# Patient Record
Sex: FEMALE | Race: WHITE | NOT HISPANIC OR LATINO | Employment: FULL TIME | URBAN - METROPOLITAN AREA
[De-identification: names, ages, dates, MRNs, and addresses within clinical notes are randomized per-mention and may not be internally consistent; named-entity substitution may affect disease eponyms.]

---

## 2020-02-28 ENCOUNTER — RECORDS - HEALTHEAST (OUTPATIENT)
Dept: LAB | Facility: CLINIC | Age: 25
End: 2020-02-28

## 2020-03-02 LAB — C TRACH DNA SPEC QL PROBE+SIG AMP: NEGATIVE

## 2020-07-17 ENCOUNTER — RECORDS - HEALTHEAST (OUTPATIENT)
Dept: LAB | Facility: CLINIC | Age: 25
End: 2020-07-17

## 2020-07-18 LAB — BACTERIA SPEC CULT: NORMAL

## 2020-07-31 ENCOUNTER — RECORDS - HEALTHEAST (OUTPATIENT)
Dept: LAB | Facility: CLINIC | Age: 25
End: 2020-07-31

## 2020-07-31 LAB — TSH SERPL DL<=0.005 MIU/L-ACNC: 2.34 UIU/ML (ref 0.3–5)

## 2020-12-09 ENCOUNTER — OFFICE VISIT (OUTPATIENT)
Dept: URGENT CARE | Facility: URGENT CARE | Age: 25
End: 2020-12-09
Payer: COMMERCIAL

## 2020-12-09 VITALS
WEIGHT: 167 LBS | TEMPERATURE: 99.4 F | DIASTOLIC BLOOD PRESSURE: 92 MMHG | HEART RATE: 98 BPM | SYSTOLIC BLOOD PRESSURE: 138 MMHG | OXYGEN SATURATION: 98 %

## 2020-12-09 DIAGNOSIS — F41.9 ACUTE ANXIETY: Primary | ICD-10-CM

## 2020-12-09 DIAGNOSIS — R07.89 ATYPICAL CHEST PAIN: ICD-10-CM

## 2020-12-09 DIAGNOSIS — R00.0 TACHYCARDIA: ICD-10-CM

## 2020-12-09 PROCEDURE — 99205 OFFICE O/P NEW HI 60 MIN: CPT | Performed by: PHYSICIAN ASSISTANT

## 2020-12-09 PROCEDURE — 93000 ELECTROCARDIOGRAM COMPLETE: CPT | Performed by: PHYSICIAN ASSISTANT

## 2020-12-09 RX ORDER — HYDROXYZINE HYDROCHLORIDE 25 MG/1
50 TABLET, FILM COATED ORAL EVERY 6 HOURS PRN
Qty: 24 TABLET | Refills: 0 | Status: SHIPPED | OUTPATIENT
Start: 2020-12-09 | End: 2020-12-12

## 2020-12-09 RX ORDER — NORGESTIMATE AND ETHINYL ESTRADIOL 0.25-0.035
1 KIT ORAL DAILY
COMMUNITY

## 2020-12-09 NOTE — PATIENT INSTRUCTIONS
Does not appear cardiac    Treat acute anxiety    Follow up right away with new or worsening symptoms as discussed    Check in with primary care provider this week if not improving        Patient Education     Anxiety Reaction  Anxiety is the feeling we all get when we think something bad might happen. It is a normal response to stress and usually causes only a mild reaction. When anxiety becomes more severe, it can interfere with daily life. In some cases, you may not even be aware of what it is you re anxious about. There may also be a genetic link or it may be a learned behavior in the home.  Both psychological and physical triggers cause stress reaction. It's often a response to fear or emotional stress, real or imagined. This stress may come from home, family, work, or social relationships.  During an anxiety reaction, you may feel:    Helpless    Nervous    Depressed    Irritable  Your body may show signs of anxiety in many ways. You may experience:    Dry mouth    Shakiness    Dizziness    Weakness    Trouble breathing    Breathing fast (hyperventilating)    Chest pressure    Sweating    Headache    Nausea    Diarrhea    Tiredness    Inability to sleep    Sexual problems  Home care    Try to locate the sources of stress in your life. They may not be obvious. These may include:  ? Daily hassles of life (such as traffic jams, missed appointments, or car troubles)  ? Major life changes, both good (new baby or job promotion) and bad (loss of job or loss of loved one)  ? Overload: feeling that you have too many responsibilities and can't take care of all of them at once  ? Feeling helpless or feeling that your problems are beyond what you re able to solve    Notice how your body reacts to stress. Learn to listen to your body signals. This will help you take action before the stress becomes severe.    When you can, do something about the source of your stress. (Avoid hassles, limit the amount of change that happens  in your life at one time and take a break when you feel overloaded).    Unfortunately, many stressful situations can't be avoided. It is necessary to learn how to better manage stress. There are many proven methods that will reduce your anxiety. These include simple things like exercise, good nutrition, and adequate rest. Also, there are certain techniques that are helpful:  ? Relaxation  ? Breathing exercises  ? Visualization  ? Biofeedback  ? Meditation  For more information about this, consult your healthcare provider or go to a local bookstore and review the many books and tapes available on this subject.  Follow-up care  If you feel that your anxiety is not responding to self-help measures, contact your healthcare provider or make an appointment with a counselor. You may need short-term psychological counseling and temporary medicine to help you manage stress.  Call 911  Call 911 if any of these happen:    Trouble breathing    Confusion    Drowsiness or trouble wakening    Fainting or loss of consciousness    Rapid heart rate    Seizure    New chest pain that becomes more severe, lasts longer, or spreads into your shoulder, arm, neck, jaw, or back  When to seek medical advice  Call your healthcare provider right away if any of these happen:    Your symptoms get worse    Severe headache not relieved by rest and mild pain reliever  [employer]Well last reviewed this educational content on 10/1/2017    8827-1052 [employer]. [address] [place] [address], [place] [address]. All rights reserved. This information is not intended as a substitute for professional medical care. Always follow your healthcare professional's instructions.           Patient Education     Uncertain Causes of Chest Pain    Chest pain can happen for a number of reasons. Sometimes the cause can't be determined. If your condition does not seem serious, and your pain does not appear to be coming from your heart, your healthcare provider may  recommend watching it closely. Sometimes the signs of a serious problem take more time to appear. Many problems not related to your heart can cause chest pain. These include:    Musculoskeletal. Costochondritis is an inflammation of the tissues around the ribs that can occur from trauma or overuse injuries, or a strain of the muscles of the chest wall    Respiratory. Pneumonia, collapsed lung (pneumothorax), or inflammation of the lining of the chest and lungs (pleurisy)    Gastrointestinal. Esophageal reflux, heartburn, ulcers, or gallbladder disease    Anxiety and panic disorders    Nerve compression and inflammation    Rare miscellaneous problems such as aortic aneurysm (a swelling of the large artery coming out of the heart) or pulmonary embolism (a blood clot in the lungs)  Home care  After your visit, follow these recommendations:    Rest today and avoid strenuous activity.    Take any prescribed medicine as directed.    Be aware of any recurrent chest pain and notice any changes  Follow-up care  Follow up with your healthcare provider if you do not start to feel better within 24 hours, or as advised.  Call 911  Call 911 if any of these occur:    A change in the type of pain: if it feels different, becomes more severe, lasts longer, or begins to spread into your shoulder, arm, neck, jaw or back    Shortness of breath or increased pain with breathing    Weakness, dizziness, or fainting    Rapid heart beat    Crushing sensation in your chest  When to seek medical advice  Call your healthcare provider right away if any of the following occur:    Cough with dark colored sputum (phlegm) or blood    Fever of 100.4 F (38 C) or higher, or as directed by your healthcare provider    Swelling, pain or redness in one leg  [employer] last reviewed this educational content on 5/1/2018 2000-2020 [employer]. [address] [place] [address]. All rights reserved. This information is not intended as a  substitute for professional medical care. Always follow your healthcare professional's instructions.

## 2020-12-09 NOTE — PROGRESS NOTES
SUBJECTIVE:   Tracy Rivera is a 25 year old female presenting with a chief complaint of chest pain since last night, coming and going lasting about 15-30 minutes.  Started at work, no specific event brought it on.  Feels like previous episodes of anxiety.  Never this long. History of stroke as a child.  Takes fluoxetine for anxiety. No other symptoms.  Symptom is there now, and was present upon rooming. Nothing seems to improve or worsen the symptoms.      Anxiety eevated, no thoughts of harming self or others.    No past medical history on file.  Current Outpatient Medications   Medication Sig Dispense Refill     FLUoxetine (PROZAC) 20 MG capsule Take 20 mg by mouth       norgestimate-ethinyl estradiol (ORTHO-CYCLEN) 0.25-35 MG-MCG tablet Take 1 tablet by mouth daily       Social History     Tobacco Use     Smoking status: Not on file   Substance Use Topics     Alcohol use: Not on file     No smoking    No smoking in home    2 strokes as a baby, with cerebral palsy.    No CV disease in first degree relatives    No surgeries.      Takes fluoxetine daily.      PCP has not recommended hydroxyzine for acute episodes.       ROS:  10 point ROS negative except as listed above      OBJECTIVE:  BP (!) 138/92   Pulse 98   Temp 99.4  F (37.4  C) (Tympanic)   Wt 75.8 kg (167 lb)   SpO2 98%   Breastfeeding No   GENERAL APPEARANCE: healthy, alert and no distress  EYES:  conjunctiva clear  HENT: ear canals and TM's normal.  Nose and mouth without ulcers, erythema or lesions  NECK: supple, nontender, no lymphadenopathy  RESP: lungs clear to auscultation - no rales, rhonchi or wheezes  CV: regular rates and rhythm, normal S1 S2, no murmur noted  ABDOMEN:  soft, nontender, no HSM or masses and bowel sounds normal  NEURO: Normal strength and tone, sensory exam grossly normal,  normal speech and mentation  SKIN: no suspicious lesions or rashes  Appearance: Well groomed, cooperative, healthy female  Orientation:  alert/oriented  Speech: regular rate and tone   Mood/Affect: Anxious, congruent  Thought Process: appropriate  Thought Content: appropriate  Psychomotor activity: No depressed psychomotor activity  Insight/judgment: Appropriate    EKG: Sinus    ASSESSMENT:  (F41.9) Acute anxiety  (primary encounter diagnosis)  (R07.89) Atypical chest pain  (R00.0) Tachycardia  Comment: Does not appear to be cardiac, gastrointestinal or pulmonary in etiology.  Wide differential considered. Likely acute anxiety exacerbation.  Plan: hydrOXYzine (ATARAX) 25 MG tablet  EKG 12-lead complete w/read - Clinics, EKG         12-lead complete w/read - Clinics               Patient Instructions   Does not appear cardiac    Treat acute anxiety    Follow up right away with new or worsening symptoms as discussed    Check in with primary care provider this week if not improving        Patient Education     Anxiety Reaction  Anxiety is the feeling we all get when we think something bad might happen. It is a normal response to stress and usually causes only a mild reaction. When anxiety becomes more severe, it can interfere with daily life. In some cases, you may not even be aware of what it is you re anxious about. There may also be a genetic link or it may be a learned behavior in the home.  Both psychological and physical triggers cause stress reaction. It's often a response to fear or emotional stress, real or imagined. This stress may come from home, family, work, or social relationships.  During an anxiety reaction, you may feel:    Helpless    Nervous    Depressed    Irritable  Your body may show signs of anxiety in many ways. You may experience:    Dry mouth    Shakiness    Dizziness    Weakness    Trouble breathing    Breathing fast (hyperventilating)    Chest pressure    Sweating    Headache    Nausea    Diarrhea    Tiredness    Inability to sleep    Sexual problems  Home care    Try to locate the sources of stress in your life. They may not  be obvious. These may include:  ? Daily hassles of life (such as traffic jams, missed appointments, or car troubles)  ? Major life changes, both good (new baby or job promotion) and bad (loss of job or loss of loved one)  ? Overload: feeling that you have too many responsibilities and can't take care of all of them at once  ? Feeling helpless or feeling that your problems are beyond what you re able to solve    Notice how your body reacts to stress. Learn to listen to your body signals. This will help you take action before the stress becomes severe.    When you can, do something about the source of your stress. (Avoid hassles, limit the amount of change that happens in your life at one time and take a break when you feel overloaded).    Unfortunately, many stressful situations can't be avoided. It is necessary to learn how to better manage stress. There are many proven methods that will reduce your anxiety. These include simple things like exercise, good nutrition, and adequate rest. Also, there are certain techniques that are helpful:  ? Relaxation  ? Breathing exercises  ? Visualization  ? Biofeedback  ? Meditation  For more information about this, consult your healthcare provider or go to a local bookstore and review the many books and tapes available on this subject.  Follow-up care  If you feel that your anxiety is not responding to self-help measures, contact your healthcare provider or make an appointment with a counselor. You may need short-term psychological counseling and temporary medicine to help you manage stress.  Call 911  Call 911 if any of these happen:    Trouble breathing    Confusion    Drowsiness or trouble wakening    Fainting or loss of consciousness    Rapid heart rate    Seizure    New chest pain that becomes more severe, lasts longer, or spreads into your shoulder, arm, neck, jaw, or back  When to seek medical advice  Call your healthcare provider right away if any of these happen:    Your  symptoms get worse    Severe headache not relieved by rest and mild pain reliever  [employer] last reviewed this educational content on 10/1/2017    6526-3796 [employer]. [address] [facility], [place] [address]. All rights reserved. This information is not intended as a substitute for professional medical care. Always follow your healthcare professional's instructions.           Patient Education     Uncertain Causes of Chest Pain    Chest pain can happen for a number of reasons. Sometimes the cause can't be determined. If your condition does not seem serious, and your pain does not appear to be coming from your heart, your healthcare provider may recommend watching it closely. Sometimes the signs of a serious problem take more time to appear. Many problems not related to your heart can cause chest pain. These include:    Musculoskeletal. Costochondritis is an inflammation of the tissues around the ribs that can occur from trauma or overuse injuries, or a strain of the muscles of the chest wall    Respiratory. Pneumonia, collapsed lung (pneumothorax), or inflammation of the lining of the chest and lungs (pleurisy)    Gastrointestinal. Esophageal reflux, heartburn, ulcers, or gallbladder disease    Anxiety and panic disorders    Nerve compression and inflammation    Rare miscellaneous problems such as aortic aneurysm (a swelling of the large artery coming out of the heart) or pulmonary embolism (a blood clot in the lungs)  Home care  After your visit, follow these recommendations:    Rest today and avoid strenuous activity.    Take any prescribed medicine as directed.    Be aware of any recurrent chest pain and notice any changes  Follow-up care  Follow up with your healthcare provider if you do not start to feel better within 24 hours, or as advised.  Call 911  Call 911 if any of these occur:    A change in the type of pain: if it feels different, becomes more severe, lasts longer, or begins to spread  into your shoulder, arm, neck, jaw or back    Shortness of breath or increased pain with breathing    Weakness, dizziness, or fainting    Rapid heart beat    Crushing sensation in your chest  When to seek medical advice  Call your healthcare provider right away if any of the following occur:    Cough with dark colored sputum (phlegm) or blood    Fever of 100.4 F (38 C) or higher, or as directed by your healthcare provider    Swelling, pain or redness in one leg  [employer]ing last reviewed this educational content on 5/1/2018 2000-2020 [employer]. [address] [place] [address]. All rights reserved. This information is not intended as a substitute for professional medical care. Always follow your healthcare professional's instructions.

## 2020-12-09 NOTE — LETTER
[facility] [place]  [address]  [address]  [place] [address]  Phone: [phone]  Fax: [phone]    December 9, 2020        Tracy Rivera  [id number]  [facility] [address]          To whom it may concern:    RE: Tracy Rivera    Patient was seen and treated today at our clinic.  Please excuse absences on 12/9 and 12/10/20.    Please contact me for questions or concerns.      Sincerely,        Carlos Blanton PA-C

## 2021-01-25 ENCOUNTER — TRANSFERRED RECORDS (OUTPATIENT)
Dept: HEALTH INFORMATION MANAGEMENT | Facility: CLINIC | Age: 26
End: 2021-01-25

## 2021-01-25 ENCOUNTER — RECORDS - HEALTHEAST (OUTPATIENT)
Dept: LAB | Facility: CLINIC | Age: 26
End: 2021-01-25

## 2021-01-26 LAB
ANION GAP SERPL CALCULATED.3IONS-SCNC: 10 MMOL/L (ref 5–18)
BUN SERPL-MCNC: 15 MG/DL (ref 8–22)
CALCIUM SERPL-MCNC: 9.6 MG/DL (ref 8.5–10.5)
CHLORIDE BLD-SCNC: 106 MMOL/L (ref 98–107)
CO2 SERPL-SCNC: 24 MMOL/L (ref 22–31)
CREAT SERPL-MCNC: 0.61 MG/DL (ref 0.6–1.1)
GFR SERPL CREATININE-BSD FRML MDRD: >60 ML/MIN/1.73M2
GLUCOSE BLD-MCNC: 88 MG/DL (ref 70–125)
POTASSIUM BLD-SCNC: 4.2 MMOL/L (ref 3.5–5)
SODIUM SERPL-SCNC: 140 MMOL/L (ref 136–145)
TSH SERPL DL<=0.005 MIU/L-ACNC: 1.5 UIU/ML (ref 0.3–5)

## 2021-02-19 ENCOUNTER — HOSPITAL ENCOUNTER (OUTPATIENT)
Dept: PHYSICAL THERAPY | Facility: CLINIC | Age: 26
Setting detail: THERAPIES SERIES
End: 2021-02-19
Attending: PHYSICIAN ASSISTANT
Payer: COMMERCIAL

## 2021-02-19 PROCEDURE — 97110 THERAPEUTIC EXERCISES: CPT | Mod: GP | Performed by: PHYSICAL THERAPIST

## 2021-02-19 PROCEDURE — 97161 PT EVAL LOW COMPLEX 20 MIN: CPT | Mod: GP | Performed by: PHYSICAL THERAPIST

## 2021-02-19 NOTE — PROGRESS NOTES
02/19/21 1500   Quick Adds   Type of Visit Initial OP PT Evaluation   General Information   Start of Care Date 02/19/21   Referring Physician Cris Mcclellan PA-C     Orders Evaluate and Treat as Indicated   Order Date 01/28/21   Medical Diagnosis Weakness, cerebral palsy, unspecified type     Onset of illness/injury or Date of Surgery 01/28/21   Surgical/Medical history reviewed Yes   Pertinent history of current problem (include personal factors and/or comorbidities that impact the POC) Tracy presents today with reports of weakness and muscle tightness. The pt enjoys running when the weather is nice. She also likes to do some cardio/workout videos at home. Finds that it takes more consentration with complex movements   Patient role/Employment history Employed   Living environment Apartment/condo   Home/Community Accessibility Comments alone   Patient/Family Goals Statement Improve L side strength, flexibility and posture   Fall Risk Screen   Fall screen completed by PT   Have you fallen 2 or more times in the past year? No   Have you fallen and had an injury in the past year? No   Is patient a fall risk? No   Abuse Screen (yes response referral indicated)   Feels Unsafe at Home or Work/School no   Feels Threatened by Someone no   Does Anyone Try to Keep You From Having Contact with Others or Doing Things Outside Your Home? no   Physical Signs of Abuse Present no   Pain   Patient currently in pain No   Cognitive Status Examination   Orientation orientation to person, place and time   Posture   Posture Forward head position   Range of Motion (ROM)   ROM Comment tightness through L glutes, hamstring, gastroc   Strength   Strength Comments L UE grossly 4/5, hip abduction 4+/5, hip flexion 4+/5, dorsiflexion 4/5   Bed Mobility   Bed Mobility Comments Independent   Transfer Skills   Transfer Comments Independent   Gait   Gait Comments Ambulates without device, acheives heel strike   Balance Special Tests   Balance  Special Tests Sit to stand reps   Balance Special Tests Sit to Stand Reps in 30 Seconds   Reps in 30 seconds 18   Sensory Examination   Sensory Perception no deficits were identified   Coordination   Coordination Comments mild incoordination with L UE with fast movements, multijoint movements   Muscle Tone   Muscle Tone Comments mild increased tone in LLE in gastroc, glutes, HS   Planned Therapy Interventions   Planned Therapy Interventions strengthening;stretching   Clinical Impression   Criteria for Skilled Therapeutic Interventions Met yes, treatment indicated   PT Diagnosis weakness and impaired mobility of L UE  and LE   Influenced by the following impairments weakness, posture, muscle length/tone/flexibility   Functional limitations due to impairments tightness and difficulty with exercising at home   Clinical Presentation Stable/Uncomplicated   Clinical Decision Making (Complexity) Low complexity   Therapy Frequency 1 time/week   Predicted Duration of Therapy Intervention (days/wks) 1 visit   Risk & Benefits of therapy have been explained Yes   Patient, Family & other staff in agreement with plan of care Yes   Clinical Impression Comments The pt requires 1 visit to initiate home program   GOALS   PT Eval Goals 1   Goal 1   Goal Identifier HEP   Goal Description The pt will be independent with a home program focusing on improving muscle length/flexibiity and strength in order to manage muscle tightness and weakness independently   Target Date 02/19/21   Date Met 02/19/21   Total Evaluation Time   PT Eval, Low Complexity Minutes (64728) 15

## 2021-03-07 ENCOUNTER — HEALTH MAINTENANCE LETTER (OUTPATIENT)
Age: 26
End: 2021-03-07

## 2021-03-12 ENCOUNTER — RECORDS - HEALTHEAST (OUTPATIENT)
Dept: LAB | Facility: CLINIC | Age: 26
End: 2021-03-12

## 2021-03-12 LAB
SARS-COV-2 PCR COMMENT: NORMAL
SARS-COV-2 RNA SPEC QL NAA+PROBE: NEGATIVE
SARS-COV-2 VIRUS SPECIMEN SOURCE: NORMAL

## 2021-04-02 ENCOUNTER — RECORDS - HEALTHEAST (OUTPATIENT)
Dept: LAB | Facility: CLINIC | Age: 26
End: 2021-04-02

## 2021-04-02 LAB — FERRITIN SERPL-MCNC: 28 NG/ML (ref 10–130)

## 2021-04-05 LAB
25(OH)D3 SERPL-MCNC: 28.9 NG/ML (ref 30–80)
B BURGDOR IGG+IGM SER QL: 0.11 INDEX VALUE

## 2021-04-22 ENCOUNTER — RECORDS - HEALTHEAST (OUTPATIENT)
Dept: LAB | Facility: CLINIC | Age: 26
End: 2021-04-22

## 2021-04-24 LAB — BACTERIA SPEC CULT: NO GROWTH

## 2021-10-11 ENCOUNTER — HEALTH MAINTENANCE LETTER (OUTPATIENT)
Age: 26
End: 2021-10-11

## 2022-03-27 ENCOUNTER — HEALTH MAINTENANCE LETTER (OUTPATIENT)
Age: 27
End: 2022-03-27

## 2022-06-08 ENCOUNTER — OFFICE VISIT (OUTPATIENT)
Dept: URGENT CARE | Facility: URGENT CARE | Age: 27
End: 2022-06-08
Payer: COMMERCIAL

## 2022-06-08 ENCOUNTER — HOSPITAL ENCOUNTER (EMERGENCY)
Facility: CLINIC | Age: 27
Discharge: HOME | End: 2022-06-08
Attending: EMERGENCY MEDICINE | Admitting: EMERGENCY MEDICINE
Payer: COMMERCIAL

## 2022-06-08 ENCOUNTER — HOSPITAL ENCOUNTER (EMERGENCY)
Facility: CLINIC | Age: 27
Discharge: HOME | End: 2022-06-08
Payer: COMMERCIAL

## 2022-06-08 VITALS
RESPIRATION RATE: 20 BRPM | TEMPERATURE: 98.2 F | OXYGEN SATURATION: 96 % | HEART RATE: 78 BPM | SYSTOLIC BLOOD PRESSURE: 145 MMHG | DIASTOLIC BLOOD PRESSURE: 98 MMHG | WEIGHT: 175 LBS

## 2022-06-08 VITALS
HEART RATE: 75 BPM | SYSTOLIC BLOOD PRESSURE: 150 MMHG | OXYGEN SATURATION: 100 % | DIASTOLIC BLOOD PRESSURE: 96 MMHG | RESPIRATION RATE: 16 BRPM | TEMPERATURE: 98 F

## 2022-06-08 DIAGNOSIS — H02.846 SWELLING OF EYELID, LEFT: ICD-10-CM

## 2022-06-08 DIAGNOSIS — H10.32 ACUTE CONJUNCTIVITIS OF LEFT EYE, UNSPECIFIED ACUTE CONJUNCTIVITIS TYPE: ICD-10-CM

## 2022-06-08 DIAGNOSIS — R03.0 ELEVATED BLOOD PRESSURE READING WITHOUT DIAGNOSIS OF HYPERTENSION: ICD-10-CM

## 2022-06-08 DIAGNOSIS — H11.422: Primary | ICD-10-CM

## 2022-06-08 PROCEDURE — 99214 OFFICE O/P EST MOD 30 MIN: CPT | Performed by: PHYSICIAN ASSISTANT

## 2022-06-08 PROCEDURE — 250N000009 HC RX 250

## 2022-06-08 PROCEDURE — 99283 EMERGENCY DEPT VISIT LOW MDM: CPT

## 2022-06-08 RX ORDER — POLYMYXIN B SULFATE AND TRIMETHOPRIM 1; 10000 MG/ML; [USP'U]/ML
1 SOLUTION OPHTHALMIC EVERY 4 HOURS
Qty: 2 ML | Refills: 0 | Status: SHIPPED | OUTPATIENT
Start: 2022-06-08 | End: 2022-06-12

## 2022-06-08 NOTE — LETTER
June 8, 2022      To Whom It May Concern:      Tracy Rivera was seen in our Emergency Department today, 06/08/22.  I expect her condition to improve over the next few days.  She may return to work/school when improved.    Sincerely,        Lluvia Slade RN

## 2022-06-08 NOTE — PATIENT INSTRUCTIONS
Workplace exposure to unknown chemical requires greater assessment than what can be provided her in UC    Please be seen in ER or by eye doctor TONIGHT to test eye pH and pressure

## 2022-06-09 NOTE — DISCHARGE INSTRUCTIONS
"Discharge Instructions  Conjunctivitis  Conjunctivitis, or \"pinkeye\", is inflammation of the conjunctiva which is the thin membrane that lines the inner surface of the eyelids and the whites of the eyes.   There are four main types of conjunctivitis: viral, bacterial, allergic, and non-specific. Both bacterial and viral conjunctivitis spread easily from one person to another by contact with the eye or another person s hands, by an object the infected person has touched, such as a door handle, or by sharing an object that has touched their eye such as a towel or pillow case. Because of this, children with bacterial conjunctivitis can t go back to school or  until they have been on antibiotic drops for 24 hours.  VIRAL CONJUNCTIVITIS:  This is typically caused by the virus that also causes the common cold and is often seen as part of a general cold.  This type of conjunctivitis is not treated with antibiotics, and usually lasts 3 - 5 days.  An over the counter antihistamine/decongestant eye drop may help to relieve the itching and irritation of viral conjunctivitis.  BACTERIAL CONJUNCTIVITIS:  This is treated with an antibiotic ointment or eye drop.  In both bacterial and viral conjunctivitis, do not wear contact lenses until your eye is no longer red.   Your contact case should be thrown away and the contacts disinfected overnight, or replaced if disposable.  NON SPECIFIC CONJUNCTIVITIS: Sometimes a red eye is caused by other things such as dry eye, chemical exposure, or foreign body in the eye such as dust or eyelash.   All of these problems generally improve on their own within 24 hours.  ALLERGIC CONJUNCTIVITIS: These are eye symptoms caused by allergies. This type of conjunctivitis will be treated with allergy medications.    Return to the Emergency Department if:  If you have blurry vision.  If you have increasing eye pain or drainage.  If you have redness or swelling in the skin around the eye.  If you " have a fever.    Follow-up with your doctor:    Your eye should improve within 2 days, if it does not, return to the Emergency Department or see your regular doctor for a second eye exam.  If you were given a prescription for medicine here today, be sure to read all of the information (including the package insert) that comes with your prescription.  This will include important information about the medicine, its side effects, and any warnings that you need to know about.  The pharmacist who fills the prescription can provide more information and answer questions you may have about the medicine.  If you have questions or concerns that the pharmacist cannot address, please call or return to the Emergency Department.       Opioid Medication Information    Pain medications are among the most commonly prescribed medicines, so we are including this information for all our patients. If you did not receive pain medication or get a prescription for pain medicine, you can ignore it.     You may have been given a prescription for an opioid (narcotic) pain medicine and/or have received a pain medicine while here in the Emergency Department. These medicines can make you drowsy or impaired. You must not drive, operate dangerous equipment, or engage in any other dangerous activities while taking these medications. If you drive while taking these medications, you could be arrested for DUI, or driving under the influence. Do not drink any alcohol while you are taking these medications.     Opioid pain medications can cause addiction. If you have a history of chemical dependency of any type, you are at a higher risk of becoming addicted to pain medications.  Only take these prescribed medications to treat your pain when all other options have been tried. Take it for as short a time and as few doses as possible. Store your pain pills in a secure place, as they are frequently stolen and provide a dangerous opportunity for children or  visitors in your house to start abusing these powerful medications. We will not replace any lost or stolen medicine.  As soon as your pain is better, you should flush all your remaining medication.     Many prescription pain medications contain Tylenol  (acetaminophen), including Vicodin , Tylenol #3 , Norco , Lortab , and Percocet .  You should not take any extra pills of Tylenol  if you are using these prescription medications or you can get very sick.  Do not ever take more than 3000 mg of acetaminophen in any 24 hour period.    All opioids tend to cause constipation. Drink plenty of water and eat foods that have a lot of fiber, such as fruits, vegetables, prune juice, apple juice and high fiber cereal.  Take a laxative if you don t move your bowels at least every other day. Miralax , Milk of Magnesia, Colace , or Senna  can be used to keep you regular.      Remember that you can always come back to the Emergency Department if you are not able to see your regular doctor in the amount of time listed above, if you get any new symptoms, or if there is anything that worries you.     Discharge Instructions  Hypertension - High Blood Pressure    During you visit to the Emergency Department, your blood pressure was higher than the recommended blood pressure.  This may be related to stress, pain, medication or other temporary conditions. In these cases, your blood pressure may return to normal on its own. If you have a history of high blood pressure, you may need to have your doctor adjust your medications. Sometimes, your high measurement here may indicate that you have developed high blood pressure that will stay high unless it is treated. Sudden very high blood pressure can cause problems, but usually high blood pressure causes problems over months to years.      Blood pressure is almost never lowered in the Emergency Department, because studies have shown that lowering blood pressure too quickly is much more  dangerous than leaving it alone.    You need to follow up with your doctor in 1-3 days to get your blood pressure rechecked.     Return to the Emergency Department if you start to have:  A severe headache.  Chest pain.  Shortness of breath.  Weakness or numbness that affects one part of the body.  Confusion.  Vision changes.  Significant swelling of legs and/or eyes.  A reaction to any medication started in the Emergency Department.    What can I do to help myself?  Avoid alcohol.  Take any blood pressure medicine that you are prescribed.  Get a good night s sleep.  Lower your salt intake.  Exercise.  Lose weight.  Manage stress.    If blood pressure medication was started in the Emergency Department:  The medicine may not have an immediate effect. The body and brain determine what blood pressure you have. The medicine s job is to retrain the body s  thermostat  to a lower blood pressure.  You will need to follow up with your doctor to see how this medicine is working for you.  If you were given a prescription for medicine here today, be sure to read all of the information (including the package insert) that comes with your prescription.  This will include important information about the medicine, its side effects, and any warnings that you need to know about.  The pharmacist who fills the prescription can provide more information and answer questions you may have about the medicine.  If you have questions or concerns that the pharmacist cannot address, please call or return to the Emergency Department.   Opioid Medication Information    Pain medications are among the most commonly prescribed medicines, so we are including this information for all our patients. If you did not receive pain medication or get a prescription for pain medicine, you can ignore it.     You may have been given a prescription for an opioid (narcotic) pain medicine and/or have received a pain medicine while here in the Emergency Department.  These medicines can make you drowsy or impaired. You must not drive, operate dangerous equipment, or engage in any other dangerous activities while taking these medications. If you drive while taking these medications, you could be arrested for DUI, or driving under the influence. Do not drink any alcohol while you are taking these medications.     Opioid pain medications can cause addiction. If you have a history of chemical dependency of any type, you are at a higher risk of becoming addicted to pain medications.  Only take these prescribed medications to treat your pain when all other options have been tried. Take it for as short a time and as few doses as possible. Store your pain pills in a secure place, as they are frequently stolen and provide a dangerous opportunity for children or visitors in your house to start abusing these powerful medications. We will not replace any lost or stolen medicine.  As soon as your pain is better, you should flush all your remaining medication.     Many prescription pain medications contain Tylenol  (acetaminophen), including Vicodin , Tylenol #3 , Norco , Lortab , and Percocet .  You should not take any extra pills of Tylenol  if you are using these prescription medications or you can get very sick.  Do not ever take more than 3000 mg of acetaminophen in any 24 hour period.    All opioids tend to cause constipation. Drink plenty of water and eat foods that have a lot of fiber, such as fruits, vegetables, prune juice, apple juice and high fiber cereal.  Take a laxative if you don t move your bowels at least every other day. Miralax , Milk of Magnesia, Colace , or Senna  can be used to keep you regular.      Remember that you can always come back to the Emergency Department if you are not able to see your regular doctor in the amount of time listed above, if you get any new symptoms, or if there is anything that worries you.

## 2022-06-09 NOTE — ED TRIAGE NOTES
Pt reports eye pain and itchiness with some eyelid swelling around 330. 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

## 2022-06-09 NOTE — ED PROVIDER NOTES
History     Chief Complaint:    Eye Pain      HPI   Tracy Rivera is a 26 year old female who presents with itching burning and discomfort in her left eye.  The symptoms started today while she was at work.  The patient says she was working with pills but no liquids or chemicals.  She works as a pharmacy tech.  The patient said that she felt some burning along the medial canthus of her left eye she rubbed it and then had increasing pain.  Following there was a film in the eye but never any vision change.  She went to her bathroom at work and flushed her eye.  The eye was swollen she went to occupational health and then to an urgent care who told her to come to the ER.  The patient brought a picture that her eyelid was swollen but now better without any treatment.  She has had no pus vision changes no history of eye problems.  In the eye currently feels better.  The patient reports her vision has been good throughout this whole time.    Review of Systems  Six-point review of systems all negative except as in HPI    Allergies:    Sulfa Drugs  Penicillins      Medications:      trimethoprim-polymyxin b (POLYTRIM) 74130-0.1 UNIT/ML-% ophthalmic solution  FLUoxetine (PROZAC) 20 MG capsule  norgestimate-ethinyl estradiol (ORTHO-CYCLEN) 0.25-35 MG-MCG tablet        Past Medical History:    No past medical history on file.  There are no problems to display for this patient.       Past Surgical History:    No eye surgery      Social History:    The patient presents to the ED with her sister  Works as a pharmacy tech    Physical Exam     Patient Vitals for the past 24 hrs:   BP Temp Temp src Pulse Resp SpO2   06/08/22 2129 (!) 150/96 -- -- 75 16 100 %   06/08/22 1957 (!) 164/113 98  F (36.7  C) Temporal 89 18 99 %       Physical Exam  General: The patient is alert, in no respiratory distress.    HENT: Mucous membranes moist.  There is injection of the left sclera.  Extra motions are intact funduscopic exam within  normal limits.  Lid lashes and lacrimal are normal except for cobblestoning of the internal surface of the lower left eyelid.  Gentle pressure over the closed eyelid is equal on both sides.       Respiratory: Breathing nonlabored      Musculoskeletal: No gross deformity.     Skin: No rashes or petechiae.  No stye or swelling around the eyelids    Neurologic: The patient is alert and appropriately interactive    Lymphatic: No cervical adenopathy.     Psychiatric: The patient is non-tearful.    Emergency Department Course         Procedures:      Emergency Department Course:             Reviewed:    I reviewed nursing notes and vitals    Assessments:   I obtained history and examined the patient as noted above.         Consults:            Interventions:    Medications   fluorescein (FUL-MELISSA) 1 MG ophthalmic strip (has no administration in time range)       Disposition:  The patient was discharged to home.     Impression & Plan      Medical Decision Making:  The patient had not felt that she had any trauma or got any medicine in her eye.  I felt that likely this is a conjunctivitis limited to 1 I with cobblestoning and injection.  As she had rubbed the eye and flushed the eye she likely exacerbated symptoms causing the swelling that I visualized on the picture of her eye.  She is since gotten better there was only ever clear tearing.  I did have her check the pH which was normal did a funduscopic exam.  I did stain the eye and there was no signs of stain uptake.  I do not think this is glaucoma.  I started her on antibiotic and will have her follow-up as an outpatient she was discharged home in good condition.  Her gross vision was intact.    Covid-19  Tracy Rivera was evaluated during a global COVID-19 pandemic, which necessitated consideration that the patient might be at risk for infection with the SARS-CoV-2 virus that causes COVID-19.   Applicable protocols for evaluation were followed during the patient's  care.   COVID-19 was considered as part of the patient's evaluation.    Diagnosis:    ICD-10-CM    1. Acute conjunctivitis of left eye, unspecified acute conjunctivitis type  H10.32    2. Elevated blood pressure reading without diagnosis of hypertension  R03.0        Discharge Medications:  Discharge Medication List as of 6/8/2022  9:14 PM      START taking these medications    Details   trimethoprim-polymyxin b (POLYTRIM) 05535-3.1 UNIT/ML-% ophthalmic solution Apply 1 drop to eye every 4 hours for 4 days, Disp-2 mL, R-0, Local Print                  Casey Gonsalez MD  06/08/22 7413

## 2022-06-10 NOTE — PROGRESS NOTES
EMPLOYER [employer]  Abrupt onset left eye swelling and discomfort following exposure to unknown chemical in pharmacy  INjury occurred this afternoon at work    SUBJECTIVE:  Chief Complaint:   Chief Complaint   Patient presents with     Urgent Care     Swollen eyes     History of Present Illness:  Tracy Rivera is a 26 year old female who presents complaining of moderate left eye edema, eyelid swelling for 1 hour(s).   Onset/timing: sudden.    Associated Signs and Symptoms: none  Treatment measures tried include: flushed with water       No past medical history on file.  Current Outpatient Medications   Medication Sig Dispense Refill     FLUoxetine (PROZAC) 20 MG capsule Take 20 mg by mouth       norgestimate-ethinyl estradiol (ORTHO-CYCLEN) 0.25-35 MG-MCG tablet Take 1 tablet by mouth daily       trimethoprim-polymyxin b (POLYTRIM) 19985-1.1 UNIT/ML-% ophthalmic solution Apply 1 drop to eye every 4 hours for 4 days 2 mL 0        ROS:  Review of systems negative except as stated above.    OBJECTIVE:  BP (!) 145/98   Pulse 78   Temp 98.2  F (36.8  C)   Resp 20   Wt 79.4 kg (175 lb)   SpO2 96%   General: no acute distress  Eye exam: right eye normal lid, conjunctiva, left eye swollen  lid, swollen conjunctiva, feels heavy  Neck: supple, non-tender, free range of motion, no adenopathy  Heart: NORMAL - regular rate and rhythm without murmur.  Lungs: normal and clear to auscultation    ASSESSMENT  (H11.422) Chemosis, left  (primary encounter diagnosis)  (H02.846) Swelling of eyelid, left  Comment: need for pH and pressure measurement  Plan: to ED or ophtho immediately by private vehicle driven by sister

## 2022-09-24 ENCOUNTER — HEALTH MAINTENANCE LETTER (OUTPATIENT)
Age: 27
End: 2022-09-24

## 2023-05-08 ENCOUNTER — HEALTH MAINTENANCE LETTER (OUTPATIENT)
Age: 28
End: 2023-05-08

## 2024-03-18 ENCOUNTER — OFFICE VISIT (OUTPATIENT)
Dept: URGENT CARE | Facility: URGENT CARE | Age: 29
End: 2024-03-18
Payer: COMMERCIAL

## 2024-03-18 VITALS
OXYGEN SATURATION: 97 % | HEIGHT: 62 IN | SYSTOLIC BLOOD PRESSURE: 156 MMHG | BODY MASS INDEX: 33.68 KG/M2 | DIASTOLIC BLOOD PRESSURE: 115 MMHG | WEIGHT: 183 LBS | TEMPERATURE: 98.1 F | HEART RATE: 86 BPM

## 2024-03-18 DIAGNOSIS — R82.90 ABNORMAL FINDING IN URINE: ICD-10-CM

## 2024-03-18 DIAGNOSIS — R03.0 ELEVATED BLOOD PRESSURE READING WITHOUT DIAGNOSIS OF HYPERTENSION: Primary | ICD-10-CM

## 2024-03-18 LAB
ALBUMIN UR-MCNC: NEGATIVE MG/DL
APPEARANCE UR: CLEAR
BACTERIA #/AREA URNS HPF: ABNORMAL /HPF
BASOPHILS # BLD AUTO: 0 10E3/UL (ref 0–0.2)
BASOPHILS NFR BLD AUTO: 0 %
BILIRUB UR QL STRIP: NEGATIVE
COLOR UR AUTO: ABNORMAL
EOSINOPHIL # BLD AUTO: 0.1 10E3/UL (ref 0–0.7)
EOSINOPHIL NFR BLD AUTO: 1 %
ERYTHROCYTE [DISTWIDTH] IN BLOOD BY AUTOMATED COUNT: 11.8 % (ref 10–15)
GLUCOSE UR STRIP-MCNC: NEGATIVE MG/DL
HCT VFR BLD AUTO: 43.9 % (ref 35–47)
HGB BLD-MCNC: 14.8 G/DL (ref 11.7–15.7)
HGB UR QL STRIP: ABNORMAL
IMM GRANULOCYTES # BLD: 0 10E3/UL
IMM GRANULOCYTES NFR BLD: 0 %
KETONES UR STRIP-MCNC: NEGATIVE MG/DL
LEUKOCYTE ESTERASE UR QL STRIP: ABNORMAL
LYMPHOCYTES # BLD AUTO: 2.3 10E3/UL (ref 0.8–5.3)
LYMPHOCYTES NFR BLD AUTO: 25 %
MCH RBC QN AUTO: 30.6 PG (ref 26.5–33)
MCHC RBC AUTO-ENTMCNC: 33.7 G/DL (ref 31.5–36.5)
MCV RBC AUTO: 91 FL (ref 78–100)
MONOCYTES # BLD AUTO: 0.3 10E3/UL (ref 0–1.3)
MONOCYTES NFR BLD AUTO: 3 %
NEUTROPHILS # BLD AUTO: 6.5 10E3/UL (ref 1.6–8.3)
NEUTROPHILS NFR BLD AUTO: 71 %
NITRATE UR QL: NEGATIVE
PH UR STRIP: 6 [PH] (ref 5–7)
PLATELET # BLD AUTO: 296 10E3/UL (ref 150–450)
RBC # BLD AUTO: 4.83 10E6/UL (ref 3.8–5.2)
RBC #/AREA URNS AUTO: ABNORMAL /HPF
SP GR UR STRIP: 1.01 (ref 1–1.03)
SQUAMOUS #/AREA URNS AUTO: ABNORMAL /LPF
UROBILINOGEN UR STRIP-ACNC: 0.2 E.U./DL
WBC # BLD AUTO: 9.3 10E3/UL (ref 4–11)
WBC #/AREA URNS AUTO: ABNORMAL /HPF

## 2024-03-18 PROCEDURE — 80061 LIPID PANEL: CPT | Performed by: PHYSICIAN ASSISTANT

## 2024-03-18 PROCEDURE — 87086 URINE CULTURE/COLONY COUNT: CPT | Performed by: PHYSICIAN ASSISTANT

## 2024-03-18 PROCEDURE — 81001 URINALYSIS AUTO W/SCOPE: CPT | Performed by: PHYSICIAN ASSISTANT

## 2024-03-18 PROCEDURE — 36415 COLL VENOUS BLD VENIPUNCTURE: CPT | Performed by: PHYSICIAN ASSISTANT

## 2024-03-18 PROCEDURE — 80048 BASIC METABOLIC PNL TOTAL CA: CPT | Performed by: PHYSICIAN ASSISTANT

## 2024-03-18 PROCEDURE — 99214 OFFICE O/P EST MOD 30 MIN: CPT | Performed by: PHYSICIAN ASSISTANT

## 2024-03-18 PROCEDURE — 85025 COMPLETE CBC W/AUTO DIFF WBC: CPT | Performed by: PHYSICIAN ASSISTANT

## 2024-03-18 RX ORDER — DULOXETIN HYDROCHLORIDE 30 MG/1
30 CAPSULE, DELAYED RELEASE ORAL DAILY
COMMUNITY
Start: 2024-03-01

## 2024-03-18 ASSESSMENT — ENCOUNTER SYMPTOMS: HEADACHES: 1

## 2024-03-18 NOTE — PATIENT INSTRUCTIONS
Measure blood pressure at home at the same time, twice a day  Record the readings  Present the reading to your provider when you follow up for blood pressure management

## 2024-03-18 NOTE — PROGRESS NOTES
Assessment & Plan     Elevated blood pressure reading without diagnosis of hypertension    -Patient with a history of elevated blood pressure. She has not been diagnosed with hypertension even though blood pressure has been elevated at different times in the past 2 years.   -Completed primary care scheduling follow up for patient  -Patient completed labs she will need for follow up with primary care for blood pressure management  -Patient instructed to measure blood pressure at home twice a day x 7 days at the same time, record the readings and present them to the provider at her visit.  -Patient was sent home with a prescription for a blood pressure monitor  - Home Blood Pressure Monitor Order for DME - ONLY FOR DME  - Basic metabolic panel  - CBC with platelets and differential  - UA Macroscopic with reflex to Microscopic and Culture - Clinic Collect  - Lipid panel reflex to direct LDL Fasting  - UA Microscopic with Reflex to Culture    Abnormal finding in urine    -Urine culture does not show signs of bladder infection  - Urine Culture Aerobic Bacterial - lab collect     Results for orders placed or performed in visit on 03/18/24   Basic metabolic panel     Status: Normal   Result Value Ref Range    Sodium 139 135 - 145 mmol/L    Potassium 4.2 3.4 - 5.3 mmol/L    Chloride 105 98 - 107 mmol/L    Carbon Dioxide (CO2) 22 22 - 29 mmol/L    Anion Gap 12 7 - 15 mmol/L    Urea Nitrogen 8.4 6.0 - 20.0 mg/dL    Creatinine 0.56 0.51 - 0.95 mg/dL    GFR Estimate >90 >60 mL/min/1.73m2    Calcium 9.3 8.6 - 10.0 mg/dL    Glucose 81 70 - 99 mg/dL   UA Macroscopic with reflex to Microscopic and Culture - Clinic Collect     Status: Abnormal    Specimen: Urine, Midstream   Result Value Ref Range    Color Urine Light Yellow Colorless, Straw, Light Yellow, Yellow    Appearance Urine Clear Clear    Glucose Urine Negative Negative mg/dL    Bilirubin Urine Negative Negative    Ketones Urine Negative Negative mg/dL    Specific Gravity  Urine 1.010 1.003 - 1.035    Blood Urine Trace (A) Negative    pH Urine 6.0 5.0 - 7.0    Protein Albumin Urine Negative Negative mg/dL    Urobilinogen Urine 0.2 0.2, 1.0 E.U./dL    Nitrite Urine Negative Negative    Leukocyte Esterase Urine Small (A) Negative   CBC with platelets and differential     Status: None   Result Value Ref Range    WBC Count 9.3 4.0 - 11.0 10e3/uL    RBC Count 4.83 3.80 - 5.20 10e6/uL    Hemoglobin 14.8 11.7 - 15.7 g/dL    Hematocrit 43.9 35.0 - 47.0 %    MCV 91 78 - 100 fL    MCH 30.6 26.5 - 33.0 pg    MCHC 33.7 31.5 - 36.5 g/dL    RDW 11.8 10.0 - 15.0 %    Platelet Count 296 150 - 450 10e3/uL    % Neutrophils 71 %    % Lymphocytes 25 %    % Monocytes 3 %    % Eosinophils 1 %    % Basophils 0 %    % Immature Granulocytes 0 %    Absolute Neutrophils 6.5 1.6 - 8.3 10e3/uL    Absolute Lymphocytes 2.3 0.8 - 5.3 10e3/uL    Absolute Monocytes 0.3 0.0 - 1.3 10e3/uL    Absolute Eosinophils 0.1 0.0 - 0.7 10e3/uL    Absolute Basophils 0.0 0.0 - 0.2 10e3/uL    Absolute Immature Granulocytes 0.0 <=0.4 10e3/uL   Lipid panel reflex to direct LDL Fasting     Status: Abnormal   Result Value Ref Range    Cholesterol 230 (H) <200 mg/dL    Triglycerides 136 <150 mg/dL    Direct Measure HDL 46 (L) >=50 mg/dL    LDL Cholesterol Calculated 157 (H) <=100 mg/dL    Non HDL Cholesterol 184 (H) <130 mg/dL    Narrative    Cholesterol  Desirable:  <200 mg/dL    Triglycerides  Normal:  Less than 150 mg/dL  Borderline High:  150-199 mg/dL  High:  200-499 mg/dL  Very High:  Greater than or equal to 500 mg/dL    Direct Measure HDL  Female:  Greater than or equal to 50 mg/dL   Male:  Greater than or equal to 40 mg/dL    LDL Cholesterol  Desirable:  <100mg/dL  Above Desirable:  100-129 mg/dL   Borderline High:  130-159 mg/dL   High:  160-189 mg/dL   Very High:  >= 190 mg/dL    Non HDL Cholesterol  Desirable:  130 mg/dL  Above Desirable:  130-159 mg/dL  Borderline High:  160-189 mg/dL  High:  190-219 mg/dL  Very High:   Greater than or equal to 220 mg/dL   UA Microscopic with Reflex to Culture     Status: Abnormal   Result Value Ref Range    Bacteria Urine Moderate (A) None Seen /HPF    RBC Urine 2-5 (A) 0-2 /HPF /HPF    WBC Urine 5-10 (A) 0-5 /HPF /HPF    Squamous Epithelials Urine Moderate (A) None Seen /LPF    Narrative    Urine Culture not indicated   Urine Culture Aerobic Bacterial - lab collect     Status: None    Specimen: Urine, Midstream   Result Value Ref Range    Culture 10,000-50,000 CFU/mL Mixture of Urogenital Debo    CBC with platelets and differential     Status: None    Narrative    The following orders were created for panel order CBC with platelets and differential.  Procedure                               Abnormality         Status                     ---------                               -----------         ------                     CBC with platelets and d...[[id number]]                      Final result                 Please view results for these tests on the individual orders.       Patient Instructions   Measure blood pressure at home at the same time, twice a day  Record the readings  Present the reading to your provider when you follow up for blood pressure management    Return if symptoms worsen or fail to improve, for Follow up.    At the end of the encounter, I discussed results, diagnosis, medications. Discussed red flags for immediate return to clinic/ER, as well as indications for follow up if no improvement. Patient understood and agreed to plan. Patient was stable for discharge.    Jennie Molina is a 28 year old female who presents to clinic today with sister  for the following health issues:  Chief Complaint   Patient presents with    Hypertension     Start today pt went to get wisdom teeth pulled denied due to BP sx headache hx cerebral palsy, 2 strokes one in womb and one outside of womb, no other symptoms      HPI    Patient reports high blood pressure. Patient was supposed to get  "wisdom teeth pulled out today. She was at the oral surgeon and when her blood pressure was measured at 176/126. The procedure was cancelled due to the elevated blood pressure. Patient`s blood pressure has been elevated in the clinic and Emergency room at different times, 2 years ago.  Blood pressure was 150/96 when patient was seen in the ED, it was 145/98 when she was seen in the Urgent care. Patient has CP, reports history of stroke as a infant. She reports headaches, but also notes she occasionally gets headaches. She denies lightheadedness, weakness         Review of Systems   Neurological:  Positive for headaches.   All other systems reviewed and are negative.      Problem List:  There are no relevant problems documented for this patient.      No past medical history on file.    Social History     Tobacco Use    Smoking status: Never    Smokeless tobacco: Never   Substance Use Topics    Alcohol use: Not on file           Objective    BP (!) 156/115   Pulse 86   Temp 98.1  F (36.7  C) (Tympanic)   Ht 1.575 m (5' 2\")   Wt 83 kg (183 lb)   SpO2 97%   BMI 33.47 kg/m    Physical Exam  Vitals and nursing note reviewed.   Constitutional:       Appearance: Normal appearance.   HENT:      Head: Normocephalic.      Right Ear: Tympanic membrane normal.      Left Ear: Tympanic membrane normal.      Nose: Nose normal.      Mouth/Throat:      Mouth: Mucous membranes are moist.      Pharynx: Oropharynx is clear.   Eyes:      Extraocular Movements: Extraocular movements intact.      Conjunctiva/sclera: Conjunctivae normal.      Pupils: Pupils are equal, round, and reactive to light.   Cardiovascular:      Rate and Rhythm: Normal rate and regular rhythm.      Heart sounds: Normal heart sounds.   Pulmonary:      Effort: Pulmonary effort is normal.      Breath sounds: Normal breath sounds.   Musculoskeletal:      Cervical back: Normal range of motion and neck supple.   Skin:     General: Skin is warm and dry.      Capillary " Refill: Capillary refill takes less than 2 seconds.      Findings: No rash.   Neurological:      General: No focal deficit present.      Mental Status: She is alert and oriented to person, place, and time.      GCS: GCS eye subscore is 4. GCS verbal subscore is 5. GCS motor subscore is 6.      Cranial Nerves: Cranial nerves 2-12 are intact.      Sensory: Sensation is intact.      Motor: Motor function is intact.      Coordination: Coordination is intact.      Gait: Gait is intact.      Deep Tendon Reflexes: Reflexes are normal and symmetric.      Comments:      Psychiatric:         Mood and Affect: Mood normal.         Behavior: Behavior normal.              Marissa Angeles PA-C

## 2024-03-19 LAB
ANION GAP SERPL CALCULATED.3IONS-SCNC: 12 MMOL/L (ref 7–15)
BACTERIA UR CULT: NORMAL
BUN SERPL-MCNC: 8.4 MG/DL (ref 6–20)
CALCIUM SERPL-MCNC: 9.3 MG/DL (ref 8.6–10)
CHLORIDE SERPL-SCNC: 105 MMOL/L (ref 98–107)
CHOLEST SERPL-MCNC: 230 MG/DL
CREAT SERPL-MCNC: 0.56 MG/DL (ref 0.51–0.95)
DEPRECATED HCO3 PLAS-SCNC: 22 MMOL/L (ref 22–29)
EGFRCR SERPLBLD CKD-EPI 2021: >90 ML/MIN/1.73M2
GLUCOSE SERPL-MCNC: 81 MG/DL (ref 70–99)
HDLC SERPL-MCNC: 46 MG/DL
LDLC SERPL CALC-MCNC: 157 MG/DL
NONHDLC SERPL-MCNC: 184 MG/DL
POTASSIUM SERPL-SCNC: 4.2 MMOL/L (ref 3.4–5.3)
SODIUM SERPL-SCNC: 139 MMOL/L (ref 135–145)
TRIGL SERPL-MCNC: 136 MG/DL

## 2024-03-20 ENCOUNTER — TELEPHONE (OUTPATIENT)
Dept: FAMILY MEDICINE | Facility: CLINIC | Age: 29
End: 2024-03-20
Payer: COMMERCIAL

## 2024-03-20 NOTE — TELEPHONE ENCOUNTER
Your cholesterol lab test results are outside of normal/outside of desirable range. Please follow-up with your primary care team to discuss your results.     Spoke to patient on the phone and delivered message as indicated by the provider above. Patient states she will follow up with plan.      Aline Sue, CMA

## 2024-03-20 NOTE — TELEPHONE ENCOUNTER
General Call    Contacts         Type Contact Phone/Fax    03/20/2024 07:52 AM CDT Phone (Incoming) Tracy Rivera (Self) [phone] (M)     Wanted to go over recent lab results. had additional questions about the urine test          Reason for Call:  LAB Results    What are your questions or concerns:  Unsure of the lab results and wanted to verify there is nothing else that needed to be followed-up on.    Date of last appointment with provider: 3/18/24    Could we send this information to you in [place] or would you prefer to receive a phone call?:   No preference   Okay to leave a detailed message?: Yes at Cell number on file:    Telephone Information:   Mobile [phone]

## 2024-03-22 ENCOUNTER — LAB REQUISITION (OUTPATIENT)
Dept: LAB | Facility: CLINIC | Age: 29
End: 2024-03-22

## 2024-03-22 DIAGNOSIS — I10 ESSENTIAL (PRIMARY) HYPERTENSION: ICD-10-CM

## 2024-03-22 PROCEDURE — 84443 ASSAY THYROID STIM HORMONE: CPT | Performed by: FAMILY MEDICINE

## 2024-03-23 LAB — TSH SERPL DL<=0.005 MIU/L-ACNC: 2.19 UIU/ML (ref 0.3–4.2)

## 2024-04-01 ENCOUNTER — HOSPITAL ENCOUNTER (OUTPATIENT)
Dept: CARDIOLOGY | Facility: CLINIC | Age: 29
Discharge: HOME | End: 2024-04-01
Attending: FAMILY MEDICINE | Admitting: FAMILY MEDICINE
Payer: COMMERCIAL

## 2024-04-01 DIAGNOSIS — I10 BENIGN ESSENTIAL HYPERTENSION: ICD-10-CM

## 2024-04-01 LAB — LVEF ECHO: NORMAL

## 2024-04-01 PROCEDURE — 93306 TTE W/DOPPLER COMPLETE: CPT | Mod: 26 | Performed by: INTERNAL MEDICINE

## 2024-04-01 PROCEDURE — 93306 TTE W/DOPPLER COMPLETE: CPT

## 2024-05-11 ENCOUNTER — HEALTH MAINTENANCE LETTER (OUTPATIENT)
Age: 29
End: 2024-05-11

## 2025-01-04 ENCOUNTER — OFFICE VISIT (OUTPATIENT)
Dept: URGENT CARE | Facility: URGENT CARE | Age: 30
End: 2025-01-04
Payer: COMMERCIAL

## 2025-01-04 VITALS
TEMPERATURE: 98.4 F | OXYGEN SATURATION: 98 % | WEIGHT: 195 LBS | SYSTOLIC BLOOD PRESSURE: 139 MMHG | BODY MASS INDEX: 35.67 KG/M2 | DIASTOLIC BLOOD PRESSURE: 89 MMHG

## 2025-01-04 DIAGNOSIS — L02.611 ABSCESS OF GREAT TOE OF RIGHT FOOT: ICD-10-CM

## 2025-01-04 DIAGNOSIS — L03.031 CELLULITIS OF GREAT TOE, RIGHT: Primary | ICD-10-CM

## 2025-01-04 PROCEDURE — 99213 OFFICE O/P EST LOW 20 MIN: CPT | Mod: 25 | Performed by: PHYSICIAN ASSISTANT

## 2025-01-04 PROCEDURE — 10060 I&D ABSCESS SIMPLE/SINGLE: CPT | Performed by: PHYSICIAN ASSISTANT

## 2025-01-04 RX ORDER — PROPRANOLOL HYDROCHLORIDE 60 MG/1
60 CAPSULE, EXTENDED RELEASE ORAL DAILY
COMMUNITY
Start: 2024-11-15

## 2025-01-04 RX ORDER — HYDROXYZINE PAMOATE 25 MG/1
25 CAPSULE ORAL
COMMUNITY
Start: 2024-07-26

## 2025-01-04 RX ORDER — DOXYCYCLINE 100 MG/1
100 CAPSULE ORAL 2 TIMES DAILY
Qty: 20 CAPSULE | Refills: 0 | Status: SHIPPED | OUTPATIENT
Start: 2025-01-04 | End: 2025-01-14

## 2025-01-04 NOTE — PROGRESS NOTES
SUBJECTIVE:  Tracy Rivera is a 29 year old female who comes in with approximately 2-week history of possible infection or ingrown right great toe.  Patient denies any known trauma.  Has noticed redness and tenderness along the cuticle margin.  She has not had any drainage or fevers.  Has given some over-the-counter med for symptomatic relief.  She is otherwise at baseline health.      No past medical history on file.  There is no problem list on file for this patient.    Current Outpatient Medications   Medication Sig Dispense Refill    diazepam (VALIUM) 1 mg TABS half-tab       DULoxetine (CYMBALTA) 30 MG capsule Take 30 mg by mouth daily      hydrOXYzine andres (VISTARIL) 25 MG capsule 25 mg.      norgestimate-ethinyl estradiol (ORTHO-CYCLEN) 0.25-35 MG-MCG tablet Take 1 tablet by mouth daily      propranolol ER (INDERAL LA) 60 MG 24 hr capsule Take 60 mg by mouth daily.      FLUoxetine (PROZAC) 20 MG capsule Take 20 mg by mouth. (Patient not taking: Reported on 1/4/2025)       No current facility-administered medications for this visit.     Social History     Socioeconomic History    Marital status: Single     Spouse name: Not on file    Number of children: Not on file    Years of education: Not on file    Highest education level: Not on file   Occupational History    Not on file   Tobacco Use    Smoking status: Never    Smokeless tobacco: Never   Substance and Sexual Activity    Alcohol use: Not on file    Drug use: Not on file    Sexual activity: Not on file   Other Topics Concern    Not on file   Social History Narrative    Not on file     Social Drivers of Health     Financial Resource Strain: Not on file   Food Insecurity: Not on file   Transportation Needs: Not on file   Physical Activity: Not on file   Stress: Not on file   Social Connections: Not on file   Interpersonal Safety: Not on file   Housing Stability: Not on file     ROS  negative other than stated above    Exam:  GENERAL APPEARANCE: healthy,  alert and no distress  EYES: EOMI,  PERRL  MS: extremities normal- no gross deformities noted, no evidence of inflammation in joints, FROM in all extremities.  SKIN: Right great toe with moderate erythema surrounding the cuticle margin along with pocket of pustular material on the medial aspect.  Very sensitive to the touch.  No streaking proximally.  Cap refill normal.  Sensation intact  NEURO: Normal strength and tone, sensory exam grossly normal, mentation intact and speech normal      assessment/plan:  (L03.031) Cellulitis of great toe, right  (primary encounter diagnosis)  Comment:   Plan: DRAIN SKIN ABSCESS SIMPLE/SINGLE, doxycycline         hyclate (VIBRAMYCIN) 100 MG capsule          Patient with a 2-week history of right great toe pain with no known trauma.  She does have localized cellulitis along with small abscess around the cuticle margin.  Consent was obtained and drainage was performed.  Toe was cleaned with alcohol swabs x 2.  11 blade was used small slit opening made near nail bed.  Green purulent drainage was removed.  Patient tolerated well.  Foot was then soaked and cleaned topical bacitracin and bandage was applied.  There is no significant ingrowing of nail that needs to be removed.  Localized infection will treat with doxycycline.  Hygiene measures reviewed.  Continue to monitor symptoms.  Over-the-counter med if needed for any pain.  Follow-up as needed    (L02.611) Abscess of great toe of right foot  Comment:   Plan: DRAIN SKIN ABSCESS SIMPLE/SINGLE, doxycycline         hyclate (VIBRAMYCIN) 100 MG capsule

## 2025-05-17 ENCOUNTER — HEALTH MAINTENANCE LETTER (OUTPATIENT)
Age: 30
End: 2025-05-17